# Patient Record
Sex: FEMALE | URBAN - METROPOLITAN AREA
[De-identification: names, ages, dates, MRNs, and addresses within clinical notes are randomized per-mention and may not be internally consistent; named-entity substitution may affect disease eponyms.]

---

## 2021-05-17 ENCOUNTER — APPOINTMENT (OUTPATIENT)
Age: 46
Setting detail: DERMATOLOGY
End: 2021-05-18

## 2021-05-17 DIAGNOSIS — L81.1 CHLOASMA: ICD-10-CM

## 2021-05-17 DIAGNOSIS — D22 MELANOCYTIC NEVI: ICD-10-CM

## 2021-05-17 PROBLEM — D22.39 MELANOCYTIC NEVI OF OTHER PARTS OF FACE: Status: ACTIVE | Noted: 2021-05-17

## 2021-05-17 PROCEDURE — 99203 OFFICE O/P NEW LOW 30 MIN: CPT

## 2021-05-17 PROCEDURE — OTHER MIPS QUALITY: OTHER

## 2021-05-17 PROCEDURE — OTHER OBSERVATION: OTHER

## 2021-05-17 PROCEDURE — OTHER COUNSELING: OTHER

## 2021-05-17 PROCEDURE — OTHER TREATMENT REGIMEN: OTHER

## 2021-05-17 PROCEDURE — OTHER IN-HOUSE DISPENSING PHARMACY: OTHER

## 2021-05-17 PROCEDURE — OTHER OTHER: OTHER

## 2021-05-17 ASSESSMENT — LOCATION SIMPLE DESCRIPTION DERM
LOCATION SIMPLE: RIGHT FOREHEAD
LOCATION SIMPLE: RIGHT CHEEK
LOCATION SIMPLE: LEFT CHEEK

## 2021-05-17 ASSESSMENT — LOCATION DETAILED DESCRIPTION DERM
LOCATION DETAILED: RIGHT INFERIOR LATERAL FOREHEAD
LOCATION DETAILED: LEFT INFERIOR CENTRAL MALAR CHEEK
LOCATION DETAILED: RIGHT INFERIOR CENTRAL MALAR CHEEK

## 2021-05-17 ASSESSMENT — LOCATION ZONE DERM: LOCATION ZONE: FACE

## 2021-05-17 NOTE — PROCEDURE: IN-HOUSE DISPENSING PHARMACY
Product 2 Application Directions: Apply to dark spots qhs x 3-4 months on/3-4 months off as needed.\\nLot. [id number] Product 2 Application Directions: Apply to dark spots qhs x 3-4 months on/3-4 months off as needed.\\nLot. [id number]@

## 2021-05-17 NOTE — PROCEDURE: OTHER
Note Text (......Xxx Chief Complaint.): This diagnosis correlates with the
Detail Level: Detailed
Render Risk Assessment In Note?: yes
Other (Free Text): pt denies prior use of HQ\\nshe denies use of HRT or OCP\\n\\ndiscussed trial of HQ/tret cream
Other (Free Text): pt interested in removal\\ndiscussed risks and benefits of shave removal vs excision\\nrisks of scarring, recurrence, discoloration discussed. pt verbalized understanding\\n\\npt would like info regarding costs, will have billing reach out to pt

## 2021-05-17 NOTE — PROCEDURE: IN-HOUSE DISPENSING PHARMACY
Product 1 Application Directions: Apply to dark spots on face qhs x 3-4 months on/3-4 months off\\nLot. #[id number] Product 1 Application Directions: Apply to dark spots on face qhs x 3-4 months on/3-4 months off\\nLot. #[id number]

## 2021-05-17 NOTE — PROCEDURE: IN-HOUSE DISPENSING PHARMACY
Product 5 Application Directions: Apply a pea size amount to clean, dry face at bedtime.\\nLot. [id number] Product 5 Application Directions: Apply a pea size amount to clean, dry face at bedtime.\\nLot. [id number]

## 2021-05-17 NOTE — PROCEDURE: IN-HOUSE DISPENSING PHARMACY
Product 3 Application Directions: Apply to rash bid prn.  Do not use for more than 2 consecutive weeks.  Do not apply to face.\\n\\nLot. [id number]\\nExp. 9/28/2017 Product 3 Application Directions: Apply to rash bid prn.  Do not use for more than 2 consecutive weeks.  Do not apply to face.\\n\\nLot. [id number]\\nExp. 9/28/2017

## 2021-08-23 ENCOUNTER — APPOINTMENT (OUTPATIENT)
Age: 46
Setting detail: DERMATOLOGY
End: 2021-08-24

## 2021-08-23 DIAGNOSIS — L81.1 CHLOASMA: ICD-10-CM

## 2021-08-23 DIAGNOSIS — L82.1 OTHER SEBORRHEIC KERATOSIS: ICD-10-CM

## 2021-08-23 PROCEDURE — 99213 OFFICE O/P EST LOW 20 MIN: CPT

## 2021-08-23 PROCEDURE — OTHER PRESCRIPTION MEDICATION MANAGEMENT: OTHER

## 2021-08-23 PROCEDURE — OTHER COUNSELING: OTHER

## 2021-08-23 PROCEDURE — OTHER OTHER: OTHER

## 2021-08-23 ASSESSMENT — LOCATION SIMPLE DESCRIPTION DERM
LOCATION SIMPLE: RIGHT CHEEK
LOCATION SIMPLE: LEFT AXILLARY VAULT
LOCATION SIMPLE: RIGHT AXILLARY VAULT
LOCATION SIMPLE: LEFT CHEEK
LOCATION SIMPLE: ABDOMEN

## 2021-08-23 ASSESSMENT — LOCATION DETAILED DESCRIPTION DERM
LOCATION DETAILED: LEFT INFERIOR CENTRAL MALAR CHEEK
LOCATION DETAILED: RIGHT INFERIOR CENTRAL MALAR CHEEK
LOCATION DETAILED: RIGHT AXILLARY VAULT
LOCATION DETAILED: RIGHT RIB CAGE
LOCATION DETAILED: SUBXIPHOID
LOCATION DETAILED: LEFT AXILLARY VAULT

## 2021-08-23 ASSESSMENT — LOCATION ZONE DERM
LOCATION ZONE: AXILLAE
LOCATION ZONE: TRUNK
LOCATION ZONE: FACE

## 2021-08-23 NOTE — PROCEDURE: OTHER
Render Risk Assessment In Note?: yes
Detail Level: Detailed
Note Text (......Xxx Chief Complaint.): This diagnosis correlates with the
Other (Free Text): Patient is happy with the improvement she has experience with compounded hydro quinone tretinoin cream. We discussed that she should alternate 3 month on 3 months off.

## 2021-09-27 ENCOUNTER — APPOINTMENT (OUTPATIENT)
Age: 46
Setting detail: DERMATOLOGY
End: 2021-09-28

## 2021-09-27 DIAGNOSIS — D485 NEOPLASM OF UNCERTAIN BEHAVIOR OF SKIN: ICD-10-CM

## 2021-09-27 PROBLEM — D48.5 NEOPLASM OF UNCERTAIN BEHAVIOR OF SKIN: Status: ACTIVE | Noted: 2021-09-27

## 2021-09-27 PROCEDURE — OTHER OTHER: OTHER

## 2021-09-27 PROCEDURE — 11440 EXC FACE-MM B9+MARG 0.5 CM/<: CPT

## 2021-09-27 PROCEDURE — OTHER PUNCH EXCISION: OTHER

## 2021-09-27 PROCEDURE — OTHER MIPS QUALITY: OTHER

## 2021-09-27 PROCEDURE — OTHER COUNSELING: OTHER

## 2021-09-27 ASSESSMENT — LOCATION ZONE DERM: LOCATION ZONE: FACE

## 2021-09-27 ASSESSMENT — LOCATION SIMPLE DESCRIPTION DERM: LOCATION SIMPLE: RIGHT FOREHEAD

## 2021-09-27 ASSESSMENT — LOCATION DETAILED DESCRIPTION DERM: LOCATION DETAILED: RIGHT INFERIOR LATERAL FOREHEAD

## 2021-09-27 NOTE — PROCEDURE: PUNCH EXCISION
Path Notes (To The Dermatopathologist): Previously bx on 5/1/7 [id number] Path Notes (To The Dermatopathologist): Previously bx on 5/1/7 [id number]

## 2021-09-27 NOTE — PROCEDURE: OTHER
Render Risk Assessment In Note?: yes
Other (Free Text): pt feels lesion is enlarging\\ndiscussed shave removal vs punch excision\\nrisks and benefits of scarring, recurrence, etc discussed\\nafter discussion, pt wanted to proceed with punch excision\\npt verbalized understanding about risks of procedure
Detail Level: Detailed
Note Text (......Xxx Chief Complaint.): This diagnosis correlates with the

## 2021-09-27 NOTE — PROCEDURE: PUNCH EXCISION
Medical Necessity Clause: This procedure was medically necessary because the lesion that was treated was: growing

## 2021-10-04 ENCOUNTER — APPOINTMENT (OUTPATIENT)
Age: 46
Setting detail: DERMATOLOGY
End: 2021-12-27

## 2021-10-04 DIAGNOSIS — Z48.02 ENCOUNTER FOR REMOVAL OF SUTURES: ICD-10-CM

## 2021-10-04 PROCEDURE — OTHER COUNSELING: OTHER

## 2021-10-04 PROCEDURE — OTHER SUTURE REMOVAL (GLOBAL PERIOD): OTHER

## 2021-10-04 ASSESSMENT — LOCATION DETAILED DESCRIPTION DERM: LOCATION DETAILED: RIGHT LATERAL FOREHEAD

## 2021-10-04 ASSESSMENT — LOCATION SIMPLE DESCRIPTION DERM: LOCATION SIMPLE: RIGHT FOREHEAD

## 2021-10-04 ASSESSMENT — LOCATION ZONE DERM: LOCATION ZONE: FACE

## 2021-10-04 NOTE — PROCEDURE: SUTURE REMOVAL (GLOBAL PERIOD)
Detail Level: Detailed
Add 10610 Cpt? (Important Note: In 2017 The Use Of 79255 Is Being Tracked By Cms To Determine Future Global Period Reimbursement For Global Periods): no

## 2021-12-16 ENCOUNTER — APPOINTMENT (OUTPATIENT)
Age: 46
Setting detail: DERMATOLOGY
End: 2021-12-16

## 2021-12-16 DIAGNOSIS — Z41.9 ENCOUNTER FOR PROCEDURE FOR PURPOSES OTHER THAN REMEDYING HEALTH STATE, UNSPECIFIED: ICD-10-CM

## 2021-12-16 PROCEDURE — OTHER PRODUCT LINE (ANTI-AGING): OTHER

## 2021-12-16 PROCEDURE — OTHER COSMETIC CONSULTATION: CHEMICAL PEELS: OTHER

## 2021-12-16 ASSESSMENT — LOCATION DETAILED DESCRIPTION DERM
LOCATION DETAILED: LEFT LATERAL MALAR CHEEK
LOCATION DETAILED: RIGHT SUPERIOR MEDIAL BUCCAL CHEEK
LOCATION DETAILED: LEFT LATERAL FOREHEAD
LOCATION DETAILED: LEFT CHIN
LOCATION DETAILED: RIGHT INFERIOR LATERAL FOREHEAD
LOCATION DETAILED: RIGHT CENTRAL MALAR CHEEK
LOCATION DETAILED: LEFT LATERAL BUCCAL CHEEK

## 2021-12-16 ASSESSMENT — LOCATION SIMPLE DESCRIPTION DERM
LOCATION SIMPLE: RIGHT CHEEK
LOCATION SIMPLE: LEFT CHEEK
LOCATION SIMPLE: LEFT FOREHEAD
LOCATION SIMPLE: RIGHT FOREHEAD
LOCATION SIMPLE: CHIN

## 2021-12-16 ASSESSMENT — LOCATION ZONE DERM: LOCATION ZONE: FACE

## 2021-12-16 NOTE — PROCEDURE: PRODUCT LINE (ANTI-AGING)
Product 21 Price (In Dollars - Numeric Only, No Special Characters Or $): 200.00
Name Of Product 23: Pigment Control w/
Name Of Product 30: Elta MD Enzyme Gel
Name Of Product 15: Elta MD SPF 40 Daily UV Tinted
Product 43 Units: 0
Product 31 Price (In Dollars - Numeric Only, No Special Characters Or $): 12.00
Product 15 Application Directions: Apply daily in morning, reapply throughout the day.
Product 57 Price (In Dollars - Numeric Only, No Special Characters Or $): 0.00
Product 46 Price (In Dollars - Numeric Only, No Special Characters Or $): 53.00
Name Of Product 19: ZO Pigment Control
Product 39 Application Directions: Use as directed
Name Of Product 33: Glycogent/ Exfoliating Accelerator
Name Of Product 38: Elta MD AM Therapy
Product 47 Application Directions: Use as directed at Night
Render Product Pricing In Note: Yes
Product 33 Price (In Dollars - Numeric Only, No Special Characters Or $): 77.00
Product 24 Application Directions: Apply after cleansing morning and night.
Product 2 Application Directions: Daytime~ after face cleanse before applying SPF
Name Of Product 44: GORDON Campa Serum
Name Of Product 41: Elta MD sport Spf 50
Product 22 Application Directions: Apply 20 minutes before sun exposure
Name Of Product 8: ZO Hydrating cleanser
Product 16 Price (In Dollars - Numeric Only, No Special Characters Or $): 116.00
Product 8 Application Directions: Use morning and night
Name Of Product 14: Skin Medica TNS Serum
Product 19 Price (In Dollars - Numeric Only, No Special Characters Or $): 72.00
Name Of Product 22: Elta MD 30 Sport
Product 32 Price (In Dollars - Numeric Only, No Special Characters Or $): 38.00
Product 13 Price (In Dollars - Numeric Only, No Special Characters Or $): $167.00
Product 36 Price (In Dollars - Numeric Only, No Special Characters Or $): 114.00
Product 12 Application Directions: Cleanse 2-3 times per week in the morning.
Product 10 Application Directions: Cleanse Morning and Night as directed
Product 2 Units: 1
Name Of Product 2: ZO Vitamin C
Name Of Product 17: Elta MD Gentle Cleanser
Product 23 Application Directions: Use morning and night on hands and chest. At night to layer with Tretinoin and light moistuizer if necessary.
Product 49 Application Directions: Apply daily
Product 23 Price (In Dollars - Numeric Only, No Special Characters Or $): 70.00
Name Of Product 28: Elta MD Moisturizer
Product 35 Price (In Dollars - Numeric Only, No Special Characters Or $): 36.00
Product 5 Price (In Dollars - Numeric Only, No Special Characters Or $): 130.00
Product 37 Application Directions: Use in shower morning and after a workout.
Product 5 Application Directions: Morning and Night
Name Of Product 35: Elta MD PM Therapy
Name Of Product 31: Elta MD lip balm
Name Of Product 48: Growth Factor Eye Serum
Name Of Product 32: Elta MD SPF 30 Lotion
Assigning Risk Information: Per AMA, level of risk is based upon consequences of the problem(s) addressed at the encounter when appropriately treated. Risk also includes medical decision making related to the need to initiate or forego further testing, treatment and/or hospitalization. Over the counter medication are assigned a risk level of low. Prescription medication management is assigned a risk level of moderate.
Name Of Product 45: Elta MD Daily UV
Product 9 Application Directions: Use daily in the morning
Name Of Product 13: ZO Radical Night Repair.
Product 45 Application Directions: Apply daily as directed
Name Of Product 25: ZO Sulfur Mask
Product 11 Application Directions: Use as directed around the eyes at night.
Name Of Product 29: Elta MD Barrier cream
Name Of Product 7: ZO Recovery cream
Name Of Product 27: Elta MD UV clear Spf 46
Product 17 Price (In Dollars - Numeric Only, No Special Characters Or $): 25.00
Product 3 Application Directions: Apply 1/2 a pea size around eyes morning and night after cleansing face.
Product 40 Price (In Dollars - Numeric Only, No Special Characters Or $): 162.00
Product 26 Application Directions: Apply to healed scar twice daily.
Name Of Product 42: Elta MD spf 47 pure
Name Of Product 5: ZO Growth Factor Eye Serum
Name Of Product 39: ZO Exfoliating Polish
Product 15 Price (In Dollars - Numeric Only, No Special Characters Or $): 33.00
Product 25 Application Directions: Apply at night after cleansing, twice a week. Leave on for 20-25 minutes then rinse.
Product 37 Price (In Dollars - Numeric Only, No Special Characters Or $): 14.00
Product 6 Price (In Dollars - Numeric Only, No Special Characters Or $): 158.00
Product 48 Application Directions: Apply to cleans skin at night.
Product 28 Price (In Dollars - Numeric Only, No Special Characters Or $): 13.00
Name Of Product 36: ZO Retinol Repair 0.05%
Name Of Product 37: Benzolyl Peroxide Wash
Product 36 Application Directions: Use at night, as directed
Product 14 Price (In Dollars - Numeric Only, No Special Characters Or $): 187.00
Product 29 Price (In Dollars - Numeric Only, No Special Characters Or $): 50.00
Product 25 Price (In Dollars - Numeric Only, No Special Characters Or $): 42.00
Product 39 Price (In Dollars - Numeric Only, No Special Characters Or $): 73.00
Product 18 Price (In Dollars - Numeric Only, No Special Characters Or $): 54.00
Product 43 Application Directions: Use at night as directed
Product 21 Application Directions: Apply on clean face morning and night
Name Of Product 34: Nutrofol Supplement
Product 34 Price (In Dollars - Numeric Only, No Special Characters Or $): 88.00
Product 17 Application Directions: Cleanse with 1 pump morning and night
Product 28 Application Directions: Apply as directed by provider
Name Of Product 43: Hydrating Cream
Product 26 Price (In Dollars - Numeric Only, No Special Characters Or $): 55.00
Risk Of Complication Category: No MDM
Product 1 Price (In Dollars - Numeric Only, No Special Characters Or $): 164.00
Name Of Product 26: Silagen Scar Gel with SPF
Name Of Product 21: Allastin
Name Of Product 47: Gilbert
Product 27 Application Directions: Apply in the morning as part of daily routine.
Product 1 Application Directions: Per directions, in the morning
Product 40 Application Directions: Use as directed each morning
Product 6 Application Directions: Applying after cleansing in the evening
Name Of Product 6: ZO Wrinkle and Texture Repair
Name Of Product 24: Complexion Renewal Pads
Product 14 Application Directions: Apply at night to clean skin.
Name Of Product 1: ZO Daily Power Defense
Product 44 Price (In Dollars - Numeric Only, No Special Characters Or $): 235.00
Name Of Product 20: Elta MD UV Physical SPF 41
Product 42 Price (In Dollars - Numeric Only, No Special Characters Or $): 32.00
Product 11 Price (In Dollars - Numeric Only, No Special Characters Or $): 153.00
Name Of Product 3: ZO Hydra Firm
Product 16 Application Directions: Apply at night after cleaning face and treatment creams.
Detail Level: Zone
Name Of Product 49: ZO Non tinted spf 50
Product 2 Price (In Dollars - Numeric Only, No Special Characters Or $): 102.00
Product 47 Price (In Dollars - Numeric Only, No Special Characters Or $): 179.00
Name Of Product 10: ZO Gentle Skin Cleanser
Product 18 Application Directions: Apply directly after cleansing morning and night
Name Of Product 11: ZO Intensive Eye Repair
Name Of Product 40: ZO Growth Factor Serum
Product 44 Application Directions: Use as directed on clean dry skin morning and night
Product 30 Price (In Dollars - Numeric Only, No Special Characters Or $): 16.00
Product 13 Application Directions: Apply to clean face at night.
Name Of Product 46: Elta MD Aero spf 45
Name Of Product 9: ZO Smart Tone
Product 19 Application Directions: Apply 1 pump to clean skin daily as instructed
Product 38 Application Directions: Apply to clean skin in the AM as directed
Product 3 Price (In Dollars - Numeric Only, No Special Characters Or $): $153.00

## 2022-02-24 ENCOUNTER — APPOINTMENT (OUTPATIENT)
Age: 47
Setting detail: DERMATOLOGY
End: 2022-02-28

## 2022-02-24 DIAGNOSIS — Z41.9 ENCOUNTER FOR PROCEDURE FOR PURPOSES OTHER THAN REMEDYING HEALTH STATE, UNSPECIFIED: ICD-10-CM

## 2022-02-24 PROCEDURE — OTHER COSMETIC CONSULTATION: CHEMICAL PEELS: OTHER

## 2022-02-24 PROCEDURE — OTHER COSMETIC CONSULTATION: COOLSCULPTING: OTHER

## 2022-02-24 PROCEDURE — OTHER OTHER: OTHER

## 2022-02-24 ASSESSMENT — LOCATION ZONE DERM
LOCATION ZONE: LEG
LOCATION ZONE: FACE

## 2022-02-24 ASSESSMENT — LOCATION DETAILED DESCRIPTION DERM
LOCATION DETAILED: LEFT ANTERIOR PROXIMAL THIGH
LOCATION DETAILED: LEFT CENTRAL MALAR CHEEK
LOCATION DETAILED: RIGHT INFERIOR CENTRAL MALAR CHEEK
LOCATION DETAILED: RIGHT ANTERIOR PROXIMAL THIGH

## 2022-02-24 ASSESSMENT — LOCATION SIMPLE DESCRIPTION DERM
LOCATION SIMPLE: LEFT CHEEK
LOCATION SIMPLE: RIGHT CHEEK
LOCATION SIMPLE: RIGHT THIGH
LOCATION SIMPLE: LEFT THIGH

## 2022-02-24 NOTE — PROCEDURE: OTHER
Render Risk Assessment In Note?: no
Detail Level: Detailed
Other (Free Text): Patient is not a candidate for coolsculpting. \\nShe is going to use up the products she has at home and then come back to to purchase HQ 4% and Retinol separate\\nPRN. bbw\\n
Note Text (......Xxx Chief Complaint.): This diagnosis correlates with the

## 2022-03-25 ENCOUNTER — APPOINTMENT (OUTPATIENT)
Age: 47
Setting detail: DERMATOLOGY
End: 2022-03-28

## 2022-03-25 DIAGNOSIS — L81.1 CHLOASMA: ICD-10-CM

## 2022-03-25 PROCEDURE — OTHER MIPS QUALITY: OTHER

## 2022-03-25 PROCEDURE — OTHER COUNSELING: OTHER

## 2022-03-25 PROCEDURE — OTHER IN-HOUSE DISPENSING PHARMACY: OTHER

## 2022-03-25 PROCEDURE — 99213 OFFICE O/P EST LOW 20 MIN: CPT

## 2022-03-25 PROCEDURE — OTHER OTHER: OTHER

## 2022-03-25 ASSESSMENT — LOCATION DETAILED DESCRIPTION DERM
LOCATION DETAILED: LEFT INFERIOR CENTRAL MALAR CHEEK
LOCATION DETAILED: RIGHT INFERIOR CENTRAL MALAR CHEEK

## 2022-03-25 ASSESSMENT — LOCATION SIMPLE DESCRIPTION DERM
LOCATION SIMPLE: RIGHT CHEEK
LOCATION SIMPLE: LEFT CHEEK

## 2022-03-25 ASSESSMENT — LOCATION ZONE DERM: LOCATION ZONE: FACE

## 2022-03-25 NOTE — HPI: FOLLOW-UP
What Is The Condition That You Are Returning For Follow-Up?: melasma
When Was Your Last Appointment?: 08/23/2021
During The Previous Visit, The Patient ....: In house 4% hydroquinone

## 2022-03-25 NOTE — PROCEDURE: IN-HOUSE DISPENSING PHARMACY
Product 24 Price/Unit (In Dollars): 0
Product 55 Unit Type: mg
Product 2 Units Dispensed: 1
Product 2 Price/Unit (In Dollars): 60
Render Product Pricing In Note: Yes
Name Of Product 2: Hydroquinone 8%
Name Of Product 1: Tretinoin cream 0.05%
Detail Level: Zone
Product 1 Price/Unit (In Dollars): 40.00
Send Charges To Patient Encounter: No
Product 1 Refills: 3
Product 2 Application Directions: Apply to dark spots on face qhs

## 2022-08-16 ENCOUNTER — RX ONLY (RX ONLY)
Age: 47
End: 2022-08-16

## 2022-08-16 RX ORDER — FLUOCINOLONE ACETONIDE, HYDROQUINONE, AND TRETINOIN .1; 40; .5 MG/G; MG/G; MG/G
CREAM TOPICAL
Qty: 30 | Refills: 5 | Status: ERX | COMMUNITY
Start: 2022-08-16

## 2023-01-20 NOTE — PROCEDURE: OTHER
Other (Free Text): PATIENT DID NOT PURCHASE TODAY-OUT OF STOCK.  WILL TRY TO PURCHASE AT [facility] Other (Free Text): PATIENT DID NOT PURCHASE TODAY-OUT OF STOCK.  WILL TRY TO PURCHASE AT [facility] no

## 2024-04-09 ENCOUNTER — APPOINTMENT (OUTPATIENT)
Dept: URBAN - METROPOLITAN AREA CLINIC 225 | Age: 49
Setting detail: DERMATOLOGY
End: 2024-04-10

## 2024-04-09 DIAGNOSIS — L81.4 OTHER MELANIN HYPERPIGMENTATION: ICD-10-CM

## 2024-04-09 DIAGNOSIS — L82.1 OTHER SEBORRHEIC KERATOSIS: ICD-10-CM

## 2024-04-09 DIAGNOSIS — L57.8 OTHER SKIN CHANGES DUE TO CHRONIC EXPOSURE TO NONIONIZING RADIATION: ICD-10-CM

## 2024-04-09 DIAGNOSIS — L73.8 OTHER SPECIFIED FOLLICULAR DISORDERS: ICD-10-CM

## 2024-04-09 PROCEDURE — 99213 OFFICE O/P EST LOW 20 MIN: CPT

## 2024-04-09 PROCEDURE — OTHER COUNSELING: OTHER

## 2024-04-09 PROCEDURE — OTHER BENIGN DESTRUCTION COSMETIC: OTHER

## 2024-04-09 PROCEDURE — OTHER MIPS QUALITY: OTHER

## 2024-04-09 ASSESSMENT — LOCATION DETAILED DESCRIPTION DERM
LOCATION DETAILED: RIGHT MEDIAL MALAR CHEEK
LOCATION DETAILED: LEFT INFERIOR MEDIAL MALAR CHEEK
LOCATION DETAILED: INFERIOR MID FOREHEAD
LOCATION DETAILED: RIGHT CENTRAL MALAR CHEEK
LOCATION DETAILED: RIGHT MEDIAL MALAR CHEEK
LOCATION DETAILED: LEFT FOREHEAD

## 2024-04-09 ASSESSMENT — PAIN INTENSITY VAS: HOW INTENSE IS YOUR PAIN 0 BEING NO PAIN, 10 BEING THE MOST SEVERE PAIN POSSIBLE?: NO PAIN

## 2024-04-09 ASSESSMENT — LOCATION ZONE DERM
LOCATION ZONE: FACE
LOCATION ZONE: FACE

## 2024-04-09 ASSESSMENT — LOCATION SIMPLE DESCRIPTION DERM
LOCATION SIMPLE: INFERIOR FOREHEAD
LOCATION SIMPLE: RIGHT CHEEK
LOCATION SIMPLE: RIGHT CHEEK
LOCATION SIMPLE: LEFT CHEEK
LOCATION SIMPLE: LEFT FOREHEAD

## 2024-04-09 NOTE — PROCEDURE: COUNSELING
Detail Level: Zone
Sunscreen Recommendations: Recommend broad spectrum SPF 30+.
Patient Specific Counseling (Will Not Stick From Patient To Patient): Informed patient that treatment of SKs are deemed cosmetic and not covered by insurance. Patient will be responsible for charges.
Detail Level: Generalized
Detail Level: Detailed